# Patient Record
Sex: MALE | Race: WHITE | NOT HISPANIC OR LATINO | RURAL
[De-identification: names, ages, dates, MRNs, and addresses within clinical notes are randomized per-mention and may not be internally consistent; named-entity substitution may affect disease eponyms.]

---

## 2023-02-21 ENCOUNTER — OFFICE VISIT (OUTPATIENT)
Dept: RURAL CLINIC 2 | Facility: CLINIC | Age: 83
End: 2023-02-21
Payer: COMMERCIAL

## 2023-02-21 ENCOUNTER — DASHBOARD ENCOUNTERS (OUTPATIENT)
Age: 83
End: 2023-02-21

## 2023-02-21 VITALS
DIASTOLIC BLOOD PRESSURE: 72 MMHG | TEMPERATURE: 97.1 F | BODY MASS INDEX: 27.2 KG/M2 | SYSTOLIC BLOOD PRESSURE: 116 MMHG | HEART RATE: 68 BPM | HEIGHT: 70 IN | WEIGHT: 190 LBS

## 2023-02-21 DIAGNOSIS — Z80.0 FAMILY HISTORY OF COLON CANCER: ICD-10-CM

## 2023-02-21 PROBLEM — 312824007: Status: ACTIVE | Noted: 2023-02-21

## 2023-02-21 PROCEDURE — 993 APS NON BILLABLE: Performed by: NURSE PRACTITIONER

## 2023-02-21 RX ORDER — MULTIVIT WITH MINERALS/LUTEIN
AS DIRECTED TABLET ORAL
Status: ACTIVE | COMMUNITY

## 2023-02-21 RX ORDER — ATORVASTATIN CALCIUM 10 MG/1
1 TABLET TABLET, FILM COATED ORAL ONCE A DAY
Status: ACTIVE | COMMUNITY

## 2023-02-21 RX ORDER — DOCUSATE SODIUM 100 MG
1 CAPSULE AS NEEDED CAPSULE ORAL ONCE A DAY
Status: DISCONTINUED | COMMUNITY

## 2023-02-21 RX ORDER — RIVAROXABAN 20 MG/1
1 TABLET WITH FOOD TABLET, FILM COATED ORAL ONCE A DAY
Status: ACTIVE | COMMUNITY

## 2023-02-21 RX ORDER — AMOXICILLIN 500 MG/1
1 CAPSULE CAPSULE ORAL
Status: ACTIVE | COMMUNITY

## 2023-02-21 RX ORDER — SOLIFENACIN SUCCINATE 10 MG/1
1 TABLET TABLET, FILM COATED ORAL ONCE A DAY
Status: DISCONTINUED | COMMUNITY

## 2023-02-21 RX ORDER — SOLIFENACIN SUCCINATE 10 MG/1
1 TABLET TABLET, FILM COATED ORAL ONCE A DAY
Status: ACTIVE | COMMUNITY

## 2023-02-21 NOTE — HPI-TODAY'S VISIT:
The patient is an 82-year-old gentleman who presents on referral from Lilo Marquez nurse practitioner to further discuss colonoscopy versus Cologuard stool testing.  A copy of this document to be sent to the referring provider.  The patient denies undergoing a colonoscopy in the past.  He reports a family history of colon cancer in his brother at the age of 59.  He feels his diagnosis of colon cancer was related to his lifestyle and does not feel that he is at high risk.  He is currently asymptomatic and denies a change in his bowel pattern, abdominal pain and rectal bleeding.  Patient made aware that he is not a candidate for Cologuard stool testing given his family history of colon cancer.  [facility] me [facility] C[facility] kit to his home.